# Patient Record
Sex: MALE | Race: BLACK OR AFRICAN AMERICAN | NOT HISPANIC OR LATINO | URBAN - METROPOLITAN AREA
[De-identification: names, ages, dates, MRNs, and addresses within clinical notes are randomized per-mention and may not be internally consistent; named-entity substitution may affect disease eponyms.]

---

## 2022-03-31 ENCOUNTER — OCCUPATIONAL HEALTH (OUTPATIENT)
Dept: URGENT CARE | Facility: CLINIC | Age: 32
End: 2022-03-31

## 2022-03-31 PROCEDURE — 80305 DRUG TEST PRSMV DIR OPT OBS: CPT | Mod: S$GLB,,, | Performed by: EMERGENCY MEDICINE

## 2022-03-31 PROCEDURE — 80305 PR COLLECTION ONLY DRUG SCREEN: ICD-10-PCS | Mod: S$GLB,,, | Performed by: EMERGENCY MEDICINE

## 2022-04-26 ENCOUNTER — HOSPITAL ENCOUNTER (EMERGENCY)
Facility: HOSPITAL | Age: 32
Discharge: HOME | End: 2022-04-26
Attending: EMERGENCY MEDICINE
Payer: COMMERCIAL

## 2022-04-26 VITALS
SYSTOLIC BLOOD PRESSURE: 122 MMHG | BODY MASS INDEX: 25.28 KG/M2 | HEIGHT: 74 IN | TEMPERATURE: 98 F | OXYGEN SATURATION: 100 % | WEIGHT: 197 LBS | HEART RATE: 65 BPM | RESPIRATION RATE: 18 BRPM | DIASTOLIC BLOOD PRESSURE: 76 MMHG

## 2022-04-26 DIAGNOSIS — M79.671 RIGHT FOOT PAIN: ICD-10-CM

## 2022-04-26 DIAGNOSIS — M25.571 RIGHT ANKLE PAIN: ICD-10-CM
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
## 2022-04-26 PROCEDURE — 87389 HIV-1 AG W/HIV-1&-2 AB AG IA: CPT | Performed by: EMERGENCY MEDICINE

## 2022-04-26 PROCEDURE — 36415 COLL VENOUS BLD VENIPUNCTURE: CPT | Performed by: EMERGENCY MEDICINE

## 2022-04-26 PROCEDURE — 99284 EMERGENCY DEPT VISIT MOD MDM: CPT | Mod: 25

## 2022-04-26 PROCEDURE — 86803 HEPATITIS C AB TEST: CPT | Performed by: EMERGENCY MEDICINE

## 2022-04-26 NOTE — Clinical Note
"Melecio"Cass Pierce was seen and treated in our emergency department on 4/26/2022.  He may return to work on 04/29/2022.       If you have any questions or concerns, please don't hesitate to call.      Kemal Dang MD"
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
## 2022-04-27 LAB
HCV AB SERPL QL IA: NEGATIVE
HIV 1+2 AB+HIV1 P24 AG SERPL QL IA: NEGATIVE

## 2022-06-16 ENCOUNTER — HOSPITAL ENCOUNTER (EMERGENCY)
Facility: HOSPITAL | Age: 32
Discharge: HOME | End: 2022-06-16
Attending: EMERGENCY MEDICINE

## 2022-06-16 VITALS
HEIGHT: 74 IN | SYSTOLIC BLOOD PRESSURE: 119 MMHG | HEART RATE: 99 BPM | TEMPERATURE: 98 F | WEIGHT: 191 LBS | OXYGEN SATURATION: 99 % | BODY MASS INDEX: 24.51 KG/M2 | DIASTOLIC BLOOD PRESSURE: 69 MMHG | RESPIRATION RATE: 16 BRPM

## 2022-06-16 DIAGNOSIS — S93.504A: Primary | ICD-10-CM

## 2022-06-16 DIAGNOSIS — M79.671 RIGHT FOOT PAIN: ICD-10-CM

## 2022-06-16 PROCEDURE — 99283 EMERGENCY DEPT VISIT LOW MDM: CPT | Mod: 25

## 2022-06-16 NOTE — DISCHARGE INSTRUCTIONS
The x-ray does not show any signs of broken bones.  You likely have a sprain of the toe.  Follow up with Podiatry as needed and take Tylenol and ibuprofen for pain.
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
## 2022-06-16 NOTE — Clinical Note
"Melecio Torre"Stan was seen and treated in our emergency department on 6/16/2022.  He may return to work on 06/21/2022.       If you have any questions or concerns, please don't hesitate to call.      Cindy Mcgrath RN RN    "

## 2022-06-22 ENCOUNTER — PATIENT OUTREACH (OUTPATIENT)
Dept: EMERGENCY MEDICINE | Facility: HOSPITAL | Age: 32
End: 2022-06-22
Payer: COMMERCIAL

## 2022-06-27 NOTE — PROGRESS NOTES
Cassandra Kelley  ED Navigator  Emergency Department    [facility] [facility] [place]  Role: Community Health Worker    Date: 06/27/2022  Patient Name: Melecio Pierce  MRN: [id number]  PCP: Primary Doctor No    Assessment:     Melecio Pierce is a 31 y.o. male who has presented to ED for toe pain. Patient has visited the ED 2 times in the past 3 months. Patient did not contact PCP.     ED Navigator Initial Assessment    ED Navigator Enrollment Documentation  Consent to Services  Does patient consent to completing the assessment?: Yes  Contact  Method of Initial Contact: Phone  Transportation  Does the patient have issues with Transportation?: No  Does the patient have transportation to and from healthcare appointments?: Yes  Insurance Coverage  Do you have coverage/adequate coverage?: Yes  Type/kind of coverage: [employer]/Medicaid  Is patient able to afford co-pays/deductibles?: Yes  Is patient able to afford HME or supplies?: Yes  Does patient have an established [facility] PCP?: No  Does patient need assistance finding a PCP?: Yes  Does the patient have a lack of adequate coverage?: No  Specialist Appointment  Did the patient come to the ED to see a specialist?: No  Does the patient have a pending specialist referral?: No  Does the patient have a specialist appointment made?: No  PCP Follow Up Appointment  Has the patient had an appointment with a primary care provider in the past year?: No  Does the patient have a follow up appontment with a PCP?: No  Why does the patient not have a follow up scheduled?: No established [facility]/outside PCP  Would you like an [facility] PCP?: Yes  Medications  Is patient able to afford medication?: Yes  Is patient unable to get medication due to lack of transportation?: No  Psychological  Does the patient have psycho-social concerns?: Yes  What concerns does the patient have?: Anxiety and/or Depression  Food  Does the patient have concerns about food?: No  Communication/Education  Does the patient  have limited English proficiency/English not primary language?: No  Does patient have low literacy and/or low health literacy?: Yes  Does patient have concerns with care?: No  Does patient have dissatisfaction with care?: No  Other Financial Concerns  Does the patient have immediate financial distress?: No  Does the patient have general financial concerns?: No  Other Social Barriers/Concerns  Does the patient have any additional barriers or concerns?: None  Primary Barrier  Barriers identified: Patient identified no barriers to care  Root Cause of ED Utilization: Patient Knowledge/Low Health Literacy  Plan to address Patient Knowledge/Low Health Literacy: Provided information for [facility] 24/7 Nurse triage line [phone] or 1-866-[facility] ([phone]), Provided information for [facility] ([facility] [employer])  Next steps: Provided Education  Was education/educational materials provided surrounding PCP services/creating a medical home?: Yes Was education verbal or written?: Written   Was education/educational materials provided surrounding low cost, healthy foods?: Yes Was education verbal or written?: Written   Was education/educational materials provided surrounding other items? If so, use comment to explain.: No    Plan: Provided information for [facility] 24/7 Nurse triage line, [phone] or 1-866-[facility] ([phone])  Expected Date of Follow Up 1: 7/25/22         Social History     Socioeconomic History    Marital status: Unknown     Social Determinants of Health     Financial Resource Strain: Low Risk     Difficulty of Paying Living Expenses: Not very hard   Food Insecurity: No Food Insecurity    Worried About Running Out of Food in the Last Year: Never true    Ran Out of Food in the Last Year: Never true   Transportation Needs: No Transportation Needs    Lack of Transportation (Medical): No    Lack of  Transportation (Non-Medical): No   Stress: No Stress Concern Present    Feeling of Stress : Only a little   Social Connections: Unknown    Frequency of Communication with Friends and Family: More than three times a week    Frequency of Social Gatherings with Friends and Family: More than three times a week    Marital Status: Never    Housing Stability: Unknown    Unable to Pay for Housing in the Last Year: No    Unstable Housing in the Last Year: No       Plan:   ED Navigator spoke with patient on the telephone and completed initial enrollment.  Patient denied any concerns with food, housing, utilities, or transportation.  Patient stated he does not have a PCP and was provided information on [facility].  Patient stated he had aa follow up appointment scheduled with a podiatrist, as was recommended in the ER, but he missed the appointment and will reschedule.  Patient stated he needs to see someone for anxiety.  Patient is not a smoker.  Patient was given education on ([facility] Right [facility] information, [facility] information, and Heart healthy diet tips).  A list of mental health resources was emailed to patient.      Cassandra Kelley  ED Navigator

## 2022-07-01 ENCOUNTER — OCCUPATIONAL HEALTH (OUTPATIENT)
Dept: URGENT CARE | Facility: CLINIC | Age: 32
End: 2022-07-01

## 2022-07-01 DIAGNOSIS — Z02.1 PRE-EMPLOYMENT EXAMINATION: Primary | ICD-10-CM

## 2022-07-01 DIAGNOSIS — Z02.83 ENCOUNTER FOR DRUG SCREENING: ICD-10-CM

## 2022-07-01 PROCEDURE — 80305 DRUG TEST PRSMV DIR OPT OBS: CPT | Mod: S$GLB,,, | Performed by: FAMILY MEDICINE

## 2022-07-01 PROCEDURE — 99499 UNLISTED E&M SERVICE: CPT | Mod: S$GLB,,, | Performed by: FAMILY MEDICINE

## 2022-07-01 PROCEDURE — 80305 OOH NON-DOT DRUG SCREEN: ICD-10-PCS | Mod: S$GLB,,, | Performed by: FAMILY MEDICINE

## 2022-07-01 PROCEDURE — 99499 PHYSICAL, BASIC COMPLEXITY: ICD-10-PCS | Mod: S$GLB,,, | Performed by: FAMILY MEDICINE

## 2022-07-25 ENCOUNTER — PATIENT OUTREACH (OUTPATIENT)
Dept: EMERGENCY MEDICINE | Facility: HOSPITAL | Age: 32
End: 2022-07-25

## 2023-09-06 NOTE — Clinical Note
"Melecio"Cass Pierce was seen and treated in our emergency department on 4/26/2022.  He may return to work on 04/29/2022.       If you have any questions or concerns, please don't hesitate to call.      Kemal Dang MD" [facility] Note    Chief Complaint      Chief Complaint   Patient presents with    Fatigue    Generalized Body Aches    Sore Throat     History of Present Illness      Osmani Rainey is a 39 y.o. male who presents today for multiple complaints.  Patient comes to appointment alone.    Had coughing, sore throat, light sneeze, mild diarrhea about 2-3 weeks ago for 4-5 days, took home test which was positive. Slight fever one night. Was sick for additional week after.   Came in waves and felt much better over the past weekend until last night.      Last night started with sore throat, body aches. Dry cough which seems to be getting worse. Felt warm last night but no fever. Some chills.  Not taking anything over the counter.  Had soft stool on 9/4, no BM since then. No SOB, has not run in the last 2 days. No issues with appetite, no nausea/vomiting..    Problem List Items Addressed This Visit    None  Visit Diagnoses       COVID-19    -  Primary    Sore throat        Dizzy        Weakness        Fatigue, unspecified type        Cough, unspecified type        Relevant Medications    benzonatate (TESSALON) 200 MG capsule            Health Maintenance   Topic Date Due    TETANUS VACCINE  01/01/2028    Lipid Panel  07/19/2028    Hepatitis C Screening  Completed       History reviewed. No pertinent past medical history.    Past Surgical History:   Procedure Laterality Date    NONE      WISDOM TOOTH EXTRACTION Bilateral        family history includes ADD / ADHD in his daughter; Alcohol abuse in his father; Cirrhosis in his father; Coronary artery disease in his maternal grandfather; Diabetes type II in his maternal grandmother; Hypertension in his mother; Leukemia in his maternal grandfather; Lung cancer in his paternal grandmother; No Known Problems in his paternal grandfather, sister, and son; Throat cancer in his paternal grandmother.    Social History     Tobacco Use    Smoking status: Never  "  Substance Use Topics    Alcohol use: Never     Comment: I drink very infrequently.    Drug use: Never       Review of Systems   Constitutional:  Positive for malaise/fatigue. Negative for chills and fever.   HENT:  Positive for sore throat.    Respiratory:  Negative for cough and shortness of breath.    Cardiovascular:  Negative for chest pain and palpitations.   Gastrointestinal:  Negative for constipation, diarrhea, nausea and vomiting.   Genitourinary:  Negative for dysuria and hematuria.   Musculoskeletal:  Negative for falls.   Neurological:  Positive for dizziness and weakness. Negative for headaches.        Outpatient Encounter Medications as of 9/6/2023   Medication Sig Dispense Refill    benzonatate (TESSALON) 200 MG capsule Take 1 capsule (200 mg total) by mouth 3 (three) times daily as needed for Cough. 30 capsule 0     No facility-administered encounter medications on file as of 9/6/2023.        Review of patient's allergies indicates:  No Known Allergies    Physical Exam      Vital Signs  Pulse: (!) 54  SpO2: 99 %  BP: 118/76  Pain Score: 0-No pain  Height and Weight  Height: 5' 11" (180.3 cm)  Weight: 87.1 kg (192 lb 0.3 oz)  BSA (Calculated - sq m): 2.09 sq meters  BMI (Calculated): 26.8  Weight in (lb) to have BMI = 25: 178.9]    Physical Exam  Constitutional:       Appearance: He is well-developed.   HENT:      Head: Normocephalic and atraumatic.   Neck:      Thyroid: No thyromegaly.   Cardiovascular:      Rate and Rhythm: Normal rate and regular rhythm.      Heart sounds: No murmur heard.  Pulmonary:      Effort: Pulmonary effort is normal. No respiratory distress.      Breath sounds: Normal breath sounds.   Abdominal:      General: There is no distension.      Palpations: Abdomen is soft.      Tenderness: There is no abdominal tenderness.   Skin:     General: Skin is warm and dry.   Neurological:      Mental Status: He is alert and oriented to person, place, and time.   Psychiatric:         " "Behavior: Behavior normal.          Laboratory:  CBC:  Recent Labs   Lab Result Units 07/19/23  0853   WBC K/uL 6.37   RBC M/uL 5.02   Hemoglobin g/dL 14.6   Hematocrit % 43.9   Platelets K/uL 255   MCV fL 88   MCH pg 29.1   MCHC g/dL 33.3     CMP:  Recent Labs   Lab Result Units 07/19/23  0853   Glucose mg/dL 96   Calcium mg/dL 9.5   Albumin g/dL 4.3   Total Protein g/dL 8.0   Sodium mmol/L 142   Potassium mmol/L 4.1   CO2 mmol/L 23   Chloride mmol/L 107   BUN mg/dL 19   Alkaline Phosphatase U/L 77   ALT U/L 10   AST U/L 15   Total Bilirubin mg/dL 0.3     URINALYSIS:  No results for input(s): "COLORU", "CLARITYU", "SPECGRAV", "PHUR", "PROTEINUA", "GLUCOSEU", "BILIRUBINCON", "BLOODU", "WBCU", "RBCU", "BACTERIA", "MUCUS", "NITRITE", "LEUKOCYTESUR", "UROBILINOGEN", "HYALINECASTS" in the last 2160 hours.   LIPIDS:  Recent Labs   Lab Result Units 07/19/23  0853   TSH uIU/mL 1.687   HDL mg/dL 42   Cholesterol mg/dL 168   Triglycerides mg/dL 98   LDL Cholesterol mg/dL 106.4   HDL/Cholesterol Ratio % 25.0   Non-HDL Cholesterol mg/dL 126   Total Cholesterol/HDL Ratio  4.0     TSH:  Recent Labs   Lab Result Units 07/19/23  0853   TSH uIU/mL 1.687     A1C:  Recent Labs   Lab Result Units 07/19/23  0853   Hemoglobin A1C % 5.1       Radiology:  No results found in the last 30 days.     Assessment/Plan     Osmani Rainey is a 39 y.o.male with:    1. COVID-19    2. Sore throat    3. Dizzy    4. Weakness    5. Fatigue, unspecified type    6. Cough, unspecified type  - benzonatate (TESSALON) 200 MG capsule; Take 1 capsule (200 mg total) by mouth 3 (three) times daily as needed for Cough.  Dispense: 30 capsule; Refill: 0    -list of OTC meds given, supportive care for likely URI  -Continue current medications and maintain follow up with specialists.    -Follow up if symptoms worsen or fail to improve.       Katie Rodriguez MD  [facility]                  "